# Patient Record
Sex: FEMALE | Race: WHITE | HISPANIC OR LATINO | Employment: OTHER | URBAN - METROPOLITAN AREA
[De-identification: names, ages, dates, MRNs, and addresses within clinical notes are randomized per-mention and may not be internally consistent; named-entity substitution may affect disease eponyms.]

---

## 2020-11-04 PROBLEM — R20.9 SKIN SENSATION DISTURBANCE: Status: ACTIVE | Noted: 2019-04-23

## 2020-11-04 PROBLEM — G56.00 CARPAL TUNNEL SYNDROME: Status: ACTIVE | Noted: 2019-04-23

## 2020-11-04 PROBLEM — E04.2 MULTIPLE THYROID NODULES: Status: ACTIVE | Noted: 2019-10-24

## 2020-11-04 PROBLEM — G56.20 ULNAR NERVE ABNORMALITY: Status: ACTIVE | Noted: 2019-04-23

## 2020-11-04 PROBLEM — M62.9 DISORDER OF MUSCLE: Status: ACTIVE | Noted: 2019-04-23

## 2024-10-10 ENCOUNTER — TELEPHONE (OUTPATIENT)
Dept: OPHTHALMOLOGY | Facility: CLINIC | Age: 69
End: 2024-10-10

## 2024-10-10 ENCOUNTER — TELEPHONE (OUTPATIENT)
Dept: OPTOMETRY | Facility: CLINIC | Age: 69
End: 2024-10-10

## 2024-10-10 NOTE — TELEPHONE ENCOUNTER
----- Message from Markie sent at 10/10/2024 10:30 AM CDT -----  Regarding: appointment access  Contact: [phone]  Pt is calling to get scheduled with an appointment for an annual eye exam. Pt feels like she needs a new prescription. Please contact pt with appointment because I tried to schedule pt appointment  and there wasn't anything open until the month of February.     Radha Martino   [phone]    Please contact pt

## 2024-10-10 NOTE — TELEPHONE ENCOUNTER
----- Message from Markie sent at 10/10/2024 10:22 AM CDT -----  Regarding: appiointment access  Contact: [phone]  Pt is calling to be seen because pt eyes have white puss coming out of her eyes and would like to be seen. Please contact pt with appointment.       Radha Reynoso   [phone]    Please contact pt

## 2024-10-10 NOTE — TELEPHONE ENCOUNTER
I spoke with pt to schedule an appt with dr. Curiel. Pt c/o bump on lid. Pt wants to call me back to schedule.